# Patient Record
Sex: FEMALE
[De-identification: names, ages, dates, MRNs, and addresses within clinical notes are randomized per-mention and may not be internally consistent; named-entity substitution may affect disease eponyms.]

---

## 2021-12-02 ENCOUNTER — TRANSCRIPTION ENCOUNTER (OUTPATIENT)
Age: 36
End: 2021-12-02

## 2022-12-19 ENCOUNTER — NON-APPOINTMENT (OUTPATIENT)
Age: 37
End: 2022-12-19

## 2022-12-21 ENCOUNTER — TRANSCRIPTION ENCOUNTER (OUTPATIENT)
Age: 37
End: 2022-12-21

## 2022-12-21 DIAGNOSIS — Z20.822 CONTACT WITH AND (SUSPECTED) EXPOSURE TO COVID-19: ICD-10-CM

## 2022-12-22 ENCOUNTER — APPOINTMENT (OUTPATIENT)
Age: 37
End: 2022-12-22

## 2022-12-22 LAB
RAPID RVP RESULT: DETECTED
RV+EV RNA SPEC QL NAA+PROBE: DETECTED
SARS-COV-2 RNA PNL RESP NAA+PROBE: NOT DETECTED

## 2022-12-24 ENCOUNTER — NON-APPOINTMENT (OUTPATIENT)
Age: 37
End: 2022-12-24

## 2023-03-13 ENCOUNTER — APPOINTMENT (OUTPATIENT)
Dept: OBGYN | Facility: CLINIC | Age: 38
End: 2023-03-13
Payer: COMMERCIAL

## 2023-03-13 ENCOUNTER — RESULT CHARGE (OUTPATIENT)
Age: 38
End: 2023-03-13

## 2023-03-13 ENCOUNTER — NON-APPOINTMENT (OUTPATIENT)
Age: 38
End: 2023-03-13

## 2023-03-13 VITALS
HEIGHT: 60 IN | BODY MASS INDEX: 25.13 KG/M2 | DIASTOLIC BLOOD PRESSURE: 70 MMHG | WEIGHT: 128 LBS | SYSTOLIC BLOOD PRESSURE: 110 MMHG

## 2023-03-13 DIAGNOSIS — Z12.4 ENCOUNTER FOR SCREENING FOR MALIGNANT NEOPLASM OF CERVIX: ICD-10-CM

## 2023-03-13 DIAGNOSIS — Z11.3 ENCOUNTER FOR SCREENING FOR INFECTIONS WITH A PREDOMINANTLY SEXUAL MODE OF TRANSMISSION: ICD-10-CM

## 2023-03-13 PROCEDURE — 90471 IMMUNIZATION ADMIN: CPT

## 2023-03-13 PROCEDURE — 90651 9VHPV VACCINE 2/3 DOSE IM: CPT

## 2023-03-13 PROCEDURE — 81025 URINE PREGNANCY TEST: CPT

## 2023-03-13 PROCEDURE — 36415 COLL VENOUS BLD VENIPUNCTURE: CPT

## 2023-03-13 PROCEDURE — 99385 PREV VISIT NEW AGE 18-39: CPT | Mod: 25

## 2023-03-13 NOTE — HISTORY OF PRESENT ILLNESS
[Patient reported PAP Smear was normal] : Patient reported PAP Smear was normal [LMP unknown] : LMP unknown [N] : Patient reports normal menses [Oral Contraceptive] : uses oral contraception pills [Y] : Positive pregnancy history [unknown] : Patient is unsure of the date of her LMP [Menarche Age: ____] : age at menarche was [unfilled] [No] : Patient does not have concerns regarding sex [Currently Active] : currently active [Patient would like to be screened for STIs] : Patient would like to be screened for STIs [Men] : men [FreeTextEntry1] : Alejandra is a  LMP unknown on BRIGIDO who presents for annual exam. She is without complaints. Denies pelvic pain, abnormal bleeding, or vaginal discharge. Denies issues with bowel or bladder function. \par She is sexually active with 1 male partner (s). She is using BRIGIDO for contraception. Denies pain with intercourse. She is sexually satisfied. \par Lives with alone. 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 Denies depression/abuse. \par Immunizations: Desires gardasil\par  [PapSmeardate] : 03/13/22 [PGxTotal] : 1 [[facility]] : 0 [PGHxABInduced] : 1

## 2023-03-13 NOTE — PHYSICAL EXAM
[Chaperone Present] : A chaperone was present in the examining room during all aspects of the physical examination [FreeTextEntry1] : ashly [Appropriately responsive] : appropriately responsive [Alert] : alert [No Acute Distress] : no acute distress [No Lymphadenopathy] : no lymphadenopathy [Soft] : soft [Non-tender] : non-tender [Non-distended] : non-distended [Oriented x3] : oriented x3 [Examination Of The Breasts] : a normal appearance [No Discharge] : no discharge [No Masses] : no breast masses were palpable [Labia Majora] : normal [Labia Minora] : normal [No Bleeding] : There was no active vaginal bleeding [Normal] : normal [Uterine Adnexae] : non-palpable

## 2023-03-13 NOTE — DISCUSSION/SUMMARY
[FreeTextEntry1] :   The benefits of adequate calcium intake and a daily multivitamin along with routine daily cardiovascular exercise were reviewed with the patient.\par   The patient was informed regarding the benefits of a screening colonoscopy.\par   The importance of safer-sex was discussed with the patient. She desires screening for sexually transmitted diseases. Bloodwork drawn today in office. \par We reviewed ASCCP/ACOG guidelines for pap smears. \par She is not sure if she desires childbearing. We discussed age-related fertility decline and I recommended consultation for egg freezing.

## 2023-03-14 LAB
C TRACH RRNA SPEC QL NAA+PROBE: NOT DETECTED
HAV IGM SER QL: NONREACTIVE
HBV CORE IGM SER QL: NONREACTIVE
HBV SURFACE AG SER QL: NONREACTIVE
HCV AB SER QL: NONREACTIVE
HCV S/CO RATIO: 0.1 S/CO
HIV1+2 AB SPEC QL IA.RAPID: NONREACTIVE
HPV HIGH+LOW RISK DNA PNL CVX: NOT DETECTED
N GONORRHOEA RRNA SPEC QL NAA+PROBE: NOT DETECTED
SOURCE TP AMPLIFICATION: NORMAL

## 2023-03-17 ENCOUNTER — NON-APPOINTMENT (OUTPATIENT)
Age: 38
End: 2023-03-17

## 2023-03-17 LAB — T PALLIDUM AB SER QL IA: NEGATIVE

## 2023-03-19 LAB — CYTOLOGY CVX/VAG DOC THIN PREP: ABNORMAL

## 2023-03-29 LAB
HCG UR QL: NEGATIVE
QUALITY CONTROL: YES

## 2023-04-28 ENCOUNTER — APPOINTMENT (OUTPATIENT)
Dept: FAMILY MEDICINE | Facility: CLINIC | Age: 38
End: 2023-04-28
Payer: COMMERCIAL

## 2023-04-28 VITALS
WEIGHT: 126 LBS | HEART RATE: 62 BPM | OXYGEN SATURATION: 98 % | BODY MASS INDEX: 24.74 KG/M2 | HEIGHT: 60 IN | DIASTOLIC BLOOD PRESSURE: 70 MMHG | SYSTOLIC BLOOD PRESSURE: 110 MMHG

## 2023-04-28 PROCEDURE — 99203 OFFICE O/P NEW LOW 30 MIN: CPT

## 2023-04-28 NOTE — HISTORY OF PRESENT ILLNESS
[FreeTextEntry8] : 39yo F presents with b/l knee pain x 1 month. Pt reports she is exercising regularly and has worsening pain when she bends downa nd extends. Pt reports no direct injury, denies any numbness/tingling in her feet.

## 2023-04-28 NOTE — PHYSICAL EXAM
[Normal] : normal rate, regular rhythm, normal S1 and S2 and no murmur heard [No Joint Swelling] : no joint swelling [No Rash] : no rash [de-identified] : +b/l tenderness over patella

## 2023-05-08 ENCOUNTER — MED ADMIN CHARGE (OUTPATIENT)
Age: 38
End: 2023-05-08

## 2023-05-08 ENCOUNTER — APPOINTMENT (OUTPATIENT)
Dept: OBGYN | Facility: CLINIC | Age: 38
End: 2023-05-08
Payer: COMMERCIAL

## 2023-05-08 DIAGNOSIS — Z23 ENCOUNTER FOR IMMUNIZATION: ICD-10-CM

## 2023-05-08 DIAGNOSIS — B00.1 HERPESVIRAL VESICULAR DERMATITIS: ICD-10-CM

## 2023-05-08 LAB
HCG UR QL: NEGATIVE
QUALITY CONTROL: YES

## 2023-05-08 PROCEDURE — 81025 URINE PREGNANCY TEST: CPT

## 2023-05-08 PROCEDURE — 90471 IMMUNIZATION ADMIN: CPT

## 2023-05-08 PROCEDURE — 90651 9VHPV VACCINE 2/3 DOSE IM: CPT

## 2023-06-09 DIAGNOSIS — H10.9 UNSPECIFIED CONJUNCTIVITIS: ICD-10-CM

## 2023-07-05 ENCOUNTER — RX RENEWAL (OUTPATIENT)
Age: 38
End: 2023-07-05

## 2023-07-10 DIAGNOSIS — T75.3XXA MOTION SICKNESS, INITIAL ENCOUNTER: ICD-10-CM

## 2023-07-10 DIAGNOSIS — F40.243 FEAR OF FLYING: ICD-10-CM

## 2023-07-10 RX ORDER — ALPRAZOLAM 0.5 MG/1
0.5 TABLET ORAL
Qty: 4 | Refills: 0 | Status: ACTIVE | COMMUNITY
Start: 2023-07-10 | End: 1900-01-01

## 2023-07-10 RX ORDER — SCOPOLAMINE 1.5 MG/1
1 PATCH, EXTENDED RELEASE TRANSDERMAL
Qty: 4 | Refills: 1 | Status: ACTIVE | COMMUNITY
Start: 2023-07-10 | End: 1900-01-01

## 2023-09-05 ENCOUNTER — APPOINTMENT (OUTPATIENT)
Dept: OBGYN | Facility: CLINIC | Age: 38
End: 2023-09-05

## 2023-09-11 ENCOUNTER — APPOINTMENT (OUTPATIENT)
Dept: OBGYN | Facility: CLINIC | Age: 38
End: 2023-09-11

## 2023-09-12 ENCOUNTER — MED ADMIN CHARGE (OUTPATIENT)
Age: 38
End: 2023-09-12

## 2023-09-12 ENCOUNTER — RESULT CHARGE (OUTPATIENT)
Age: 38
End: 2023-09-12

## 2023-09-12 ENCOUNTER — APPOINTMENT (OUTPATIENT)
Dept: OBGYN | Facility: CLINIC | Age: 38
End: 2023-09-12
Payer: COMMERCIAL

## 2023-09-12 LAB
HCG UR QL: NEGATIVE
QUALITY CONTROL: YES

## 2023-09-12 PROCEDURE — 90651 9VHPV VACCINE 2/3 DOSE IM: CPT

## 2023-09-12 PROCEDURE — 81025 URINE PREGNANCY TEST: CPT

## 2023-09-12 PROCEDURE — 90471 IMMUNIZATION ADMIN: CPT

## 2023-10-02 DIAGNOSIS — L65.9 NONSCARRING HAIR LOSS, UNSPECIFIED: ICD-10-CM

## 2023-10-03 ENCOUNTER — APPOINTMENT (OUTPATIENT)
Age: 38
End: 2023-10-03

## 2023-10-03 LAB
ANTI-MUELLERIAN HORMONE: 1.34 NG/ML
DHEA-S SERPL-MCNC: 124 UG/DL
ESTRADIOL SERPL-MCNC: <5 PG/ML
FERRITIN SERPL-MCNC: 64 NG/ML
FOLATE SERPL-MCNC: 13.7 NG/ML
FSH SERPL-MCNC: 4.7 IU/L
IRON SATN MFR SERPL: 26 %
IRON SERPL-MCNC: 113 UG/DL
LH SERPL-ACNC: 4.9 IU/L
TESTOST FREE SERPL-MCNC: 1.6 PG/ML
TESTOST SERPL-MCNC: 58.1 NG/DL
TIBC SERPL-MCNC: 442 UG/DL
UIBC SERPL-MCNC: 329 UG/DL
VIT B12 SERPL-MCNC: 357 PG/ML

## 2023-10-06 ENCOUNTER — APPOINTMENT (OUTPATIENT)
Age: 38
End: 2023-10-06

## 2023-10-06 LAB — 17OHP SERPL-MCNC: <10 NG/DL

## 2023-12-11 RX ORDER — FAMCICLOVIR 500 MG/1
500 TABLET, FILM COATED ORAL
Qty: 60 | Refills: 1 | Status: ACTIVE | COMMUNITY
Start: 2023-05-08 | End: 1900-01-01

## 2024-01-29 ENCOUNTER — APPOINTMENT (OUTPATIENT)
Dept: FAMILY MEDICINE | Facility: CLINIC | Age: 39
End: 2024-01-29
Payer: COMMERCIAL

## 2024-01-29 ENCOUNTER — LABORATORY RESULT (OUTPATIENT)
Age: 39
End: 2024-01-29

## 2024-01-29 DIAGNOSIS — M25.562 PAIN IN RIGHT KNEE: ICD-10-CM

## 2024-01-29 DIAGNOSIS — Z32.02 ENCOUNTER FOR PREGNANCY TEST, RESULT NEGATIVE: ICD-10-CM

## 2024-01-29 DIAGNOSIS — Z23 ENCOUNTER FOR IMMUNIZATION: ICD-10-CM

## 2024-01-29 DIAGNOSIS — R92.30 DENSE BREASTS, UNSPECIFIED: ICD-10-CM

## 2024-01-29 DIAGNOSIS — E06.9 THYROIDITIS, UNSPECIFIED: ICD-10-CM

## 2024-01-29 DIAGNOSIS — T14.8XXA OTHER INJURY OF UNSPECIFIED BODY REGION, INITIAL ENCOUNTER: ICD-10-CM

## 2024-01-29 DIAGNOSIS — Z13.220 ENCOUNTER FOR SCREENING FOR LIPOID DISORDERS: ICD-10-CM

## 2024-01-29 DIAGNOSIS — Z00.00 ENCOUNTER FOR GENERAL ADULT MEDICAL EXAMINATION W/OUT ABNORMAL FINDINGS: ICD-10-CM

## 2024-01-29 DIAGNOSIS — Z13.29 ENCOUNTER FOR SCREENING FOR OTHER SUSPECTED ENDOCRINE DISORDER: ICD-10-CM

## 2024-01-29 DIAGNOSIS — E53.8 DEFICIENCY OF OTHER SPECIFIED B GROUP VITAMINS: ICD-10-CM

## 2024-01-29 DIAGNOSIS — Z12.31 ENCOUNTER FOR SCREENING MAMMOGRAM FOR MALIGNANT NEOPLASM OF BREAST: ICD-10-CM

## 2024-01-29 DIAGNOSIS — Z13.89 ENCOUNTER FOR SCREENING FOR OTHER DISORDER: ICD-10-CM

## 2024-01-29 DIAGNOSIS — Z13.1 ENCOUNTER FOR SCREENING FOR DIABETES MELLITUS: ICD-10-CM

## 2024-01-29 DIAGNOSIS — M25.561 PAIN IN RIGHT KNEE: ICD-10-CM

## 2024-01-29 PROCEDURE — 99214 OFFICE O/P EST MOD 30 MIN: CPT

## 2024-01-29 RX ORDER — TOBRAMYCIN AND DEXAMETHASONE 3; 1 MG/ML; MG/ML
0.3-0.1 SUSPENSION/ DROPS OPHTHALMIC
Qty: 1 | Refills: 0 | Status: COMPLETED | COMMUNITY
Start: 2023-06-09 | End: 2024-01-29

## 2024-01-29 RX ORDER — NORETHINDRONE ACETATE AND ETHINYL ESTRADIOL 1MG-20(24)
1-20 KIT ORAL
Refills: 0 | Status: DISCONTINUED | COMMUNITY
End: 2024-01-29

## 2024-01-29 RX ORDER — AMOXICILLIN AND CLAVULANATE POTASSIUM 875; 125 MG/1; MG/1
875-125 TABLET, COATED ORAL
Qty: 14 | Refills: 0 | Status: COMPLETED | COMMUNITY
Start: 2023-10-04 | End: 2024-01-29

## 2024-01-29 NOTE — HISTORY OF PRESENT ILLNESS
[FreeTextEntry1] : CONOR is a 39 year old female here for a CPE. [de-identified] : 39 year old female for annual exam  sees GYn on [facility]CP phmx sig for "thyroiditis" family hx Father unknown, Mother healthy, maternal GM goiter  exercises regularly healthy diet

## 2024-01-29 NOTE — HEALTH RISK ASSESSMENT
[Patient reported PAP Smear was abnormal] : Patient reported PAP Smear was abnormal [No falls in past year] : Patient reported no falls in the past year [None] : None [Never] : Never [Change in mental status noted] : No change in mental status noted [Good] : ~his/her~  mood as  good [Yes] : Yes [Monthly or less (1 pt)] : Monthly or less (1 point) [1 or 2 (0 pts)] : 1 or 2 (0 points) [Never (0 pts)] : Never (0 points) [0] : 2) Feeling down, depressed, or hopeless: Not at all (0) [PHQ-2 Negative - No further assessment needed] : PHQ-2 Negative - No further assessment needed [Audit-CScore] : 1 [CSF2Lvexb] : 0 [de-identified] : regular aerobic and strength training [PapSmearDate] : 03/23

## 2024-01-29 NOTE — HEALTH RISK ASSESSMENT
[Patient reported PAP Smear was abnormal] : Patient reported PAP Smear was abnormal [No falls in past year] : Patient reported no falls in the past year [None] : None [Never] : Never [Change in mental status noted] : No change in mental status noted [Good] : ~his/her~  mood as  good [Yes] : Yes [Monthly or less (1 pt)] : Monthly or less (1 point) [1 or 2 (0 pts)] : 1 or 2 (0 points) [Never (0 pts)] : Never (0 points) [0] : 2) Feeling down, depressed, or hopeless: Not at all (0) [PHQ-2 Negative - No further assessment needed] : PHQ-2 Negative - No further assessment needed [Audit-CScore] : 1 [de-identified] : regular aerobic and strength training [[id number]] : 0 [PapSmearDate] : 03/23

## 2024-01-29 NOTE — HISTORY OF PRESENT ILLNESS
[FreeTextEntry1] : CONOR is a 39 year old female here for a CPE. [de-identified] : 39 year old female for annual exam  sees GYn on [facility]CP phmx sig for "thyroiditis" family hx Father unknown, Mother healthy, maternal GM goiter  exercises regularly healthy diet

## 2024-01-30 LAB
25(OH)D3 SERPL-MCNC: 32.4 NG/ML
ALBUMIN SERPL ELPH-MCNC: 4.4 G/DL
ALP BLD-CCNC: 84 U/L
ALT SERPL-CCNC: 19 U/L
ANION GAP SERPL CALC-SCNC: 10 MMOL/L
APPEARANCE: CLEAR
AST SERPL-CCNC: 33 U/L
BACTERIA: NEGATIVE /HPF
BASOPHILS # BLD AUTO: 0.03 K/UL
BASOPHILS NFR BLD AUTO: 0.5 %
BILIRUB SERPL-MCNC: 0.4 MG/DL
BILIRUBIN URINE: NEGATIVE
BLOOD URINE: ABNORMAL
BUN SERPL-MCNC: 21 MG/DL
C TRACH RRNA SPEC QL NAA+PROBE: NOT DETECTED
CALCIUM SERPL-MCNC: 10 MG/DL
CAST: 0 /LPF
CHLORIDE SERPL-SCNC: 102 MMOL/L
CHOLEST SERPL-MCNC: 156 MG/DL
CO2 SERPL-SCNC: 23 MMOL/L
COLOR: YELLOW
CREAT SERPL-MCNC: 0.86 MG/DL
EGFR: 88 ML/MIN/1.73M2
EOSINOPHIL # BLD AUTO: 0.17 K/UL
EOSINOPHIL NFR BLD AUTO: 2.7 %
EPITHELIAL CELLS: 2 /HPF
ESTIMATED AVERAGE GLUCOSE: 97 MG/DL
GLUCOSE QUALITATIVE U: NEGATIVE MG/DL
GLUCOSE SERPL-MCNC: 91 MG/DL
HBA1C MFR BLD HPLC: 5 %
HCT VFR BLD CALC: 44.6 %
HDLC SERPL-MCNC: 49 MG/DL
HGB BLD-MCNC: 14.6 G/DL
HSV 1+2 IGG SER IA-IMP: NEGATIVE
HSV 1+2 IGG SER IA-IMP: POSITIVE
HSV1 IGG SER QL: 50.5 INDEX
HSV2 IGG SER QL: 0.15 INDEX
IMM GRANULOCYTES NFR BLD AUTO: 0.3 %
KETONES URINE: NEGATIVE MG/DL
LDLC SERPL CALC-MCNC: 91 MG/DL
LEUKOCYTE ESTERASE URINE: NEGATIVE
LYMPHOCYTES # BLD AUTO: 2.25 K/UL
LYMPHOCYTES NFR BLD AUTO: 35.4 %
MAN DIFF?: NORMAL
MCHC RBC-ENTMCNC: 29.9 PG
MCHC RBC-ENTMCNC: 32.7 GM/DL
MCV RBC AUTO: 91.2 FL
MICROSCOPIC-UA: NORMAL
MONOCYTES # BLD AUTO: 0.46 K/UL
MONOCYTES NFR BLD AUTO: 7.2 %
N GONORRHOEA RRNA SPEC QL NAA+PROBE: NOT DETECTED
NEUTROPHILS # BLD AUTO: 3.43 K/UL
NEUTROPHILS NFR BLD AUTO: 53.9 %
NITRITE URINE: NEGATIVE
NONHDLC SERPL-MCNC: 107 MG/DL
PH URINE: 6
PLATELET # BLD AUTO: 413 K/UL
POTASSIUM SERPL-SCNC: 5.4 MMOL/L
PROT SERPL-MCNC: 8.2 G/DL
PROTEIN URINE: NEGATIVE MG/DL
RBC # BLD: 4.89 M/UL
RBC # FLD: 14.4 %
RED BLOOD CELLS URINE: 0 /HPF
SODIUM SERPL-SCNC: 135 MMOL/L
SOURCE AMPLIFICATION: NORMAL
SPECIFIC GRAVITY URINE: 1.02
T PALLIDUM AB SER QL IA: NEGATIVE
T3 SERPL-MCNC: 127 NG/DL
T3FREE SERPL-MCNC: 2.98 PG/ML
T3RU NFR SERPL: 1 TBI
T4 FREE SERPL-MCNC: 1.3 NG/DL
T4 SERPL-MCNC: 9 UG/DL
THYROGLOB AB SERPL-ACNC: <20 IU/ML
THYROPEROXIDASE AB SERPL IA-ACNC: 242 IU/ML
TRIGL SERPL-MCNC: 85 MG/DL
TSH SERPL-ACNC: 1.13 UIU/ML
URATE SERPL-MCNC: 3.9 MG/DL
UROBILINOGEN URINE: 0.2 MG/DL
WBC # FLD AUTO: 6.36 K/UL
WHITE BLOOD CELLS URINE: 0 /HPF

## 2024-01-31 LAB — HIV1+2 AB SPEC QL IA.RAPID: NONREACTIVE

## 2024-02-05 LAB — TSI ACT/NOR SER: <0.1 IU/L

## 2024-02-17 ENCOUNTER — OUTPATIENT (OUTPATIENT)
Dept: OUTPATIENT SERVICES | Facility: HOSPITAL | Age: 39
LOS: 1 days | End: 2024-02-17
Payer: COMMERCIAL

## 2024-02-17 ENCOUNTER — RESULT REVIEW (OUTPATIENT)
Age: 39
End: 2024-02-17

## 2024-02-17 ENCOUNTER — APPOINTMENT (OUTPATIENT)
Dept: MAMMOGRAPHY | Facility: CLINIC | Age: 39
End: 2024-02-17
Payer: COMMERCIAL

## 2024-02-17 ENCOUNTER — APPOINTMENT (OUTPATIENT)
Dept: ULTRASOUND IMAGING | Facility: CLINIC | Age: 39
End: 2024-02-17
Payer: COMMERCIAL

## 2024-02-17 DIAGNOSIS — Z12.31 ENCOUNTER FOR SCREENING MAMMOGRAM FOR MALIGNANT NEOPLASM OF BREAST: ICD-10-CM

## 2024-02-17 PROCEDURE — 77067 SCR MAMMO BI INCL CAD: CPT

## 2024-02-17 PROCEDURE — 76536 US EXAM OF HEAD AND NECK: CPT

## 2024-02-17 PROCEDURE — 77063 BREAST TOMOSYNTHESIS BI: CPT | Mod: 26

## 2024-02-17 PROCEDURE — 77063 BREAST TOMOSYNTHESIS BI: CPT

## 2024-02-17 PROCEDURE — 76536 US EXAM OF HEAD AND NECK: CPT | Mod: 26

## 2024-02-17 PROCEDURE — 76641 ULTRASOUND BREAST COMPLETE: CPT | Mod: 26,50

## 2024-02-17 PROCEDURE — 77067 SCR MAMMO BI INCL CAD: CPT | Mod: 26

## 2024-02-17 PROCEDURE — 76641 ULTRASOUND BREAST COMPLETE: CPT

## 2024-03-19 ENCOUNTER — RX RENEWAL (OUTPATIENT)
Age: 39
End: 2024-03-19

## 2024-04-17 ENCOUNTER — APPOINTMENT (OUTPATIENT)
Dept: OBGYN | Facility: CLINIC | Age: 39
End: 2024-04-17
Payer: COMMERCIAL

## 2024-04-17 VITALS
WEIGHT: 130 LBS | SYSTOLIC BLOOD PRESSURE: 124 MMHG | DIASTOLIC BLOOD PRESSURE: 70 MMHG | BODY MASS INDEX: 25.52 KG/M2 | HEIGHT: 60 IN

## 2024-04-17 DIAGNOSIS — Z01.419 ENCOUNTER FOR GYNECOLOGICAL EXAMINATION (GENERAL) (ROUTINE) W/OUT ABNORMAL FINDINGS: ICD-10-CM

## 2024-04-17 DIAGNOSIS — N95.2 POSTMENOPAUSAL ATROPHIC VAGINITIS: ICD-10-CM

## 2024-04-17 DIAGNOSIS — Z30.011 ENCOUNTER FOR INITIAL PRESCRIPTION OF CONTRACEPTIVE PILLS: ICD-10-CM

## 2024-04-17 PROCEDURE — 99396 PREV VISIT EST AGE 40-64: CPT

## 2024-04-17 PROCEDURE — 99459 PELVIC EXAMINATION: CPT

## 2024-04-17 RX ORDER — ESTRADIOL 10 UG/1
10 TABLET, FILM COATED VAGINAL
Qty: 36 | Refills: 1 | Status: ACTIVE | COMMUNITY
Start: 2024-04-17 | End: 1900-01-01

## 2024-04-17 NOTE — HISTORY OF PRESENT ILLNESS
[FreeTextEntry1] : Alejandra is a  LMP unknown on BRIGIDO who presents for annual exam. She is without complaints. Denies pelvic pain, abnormal bleeding, or vaginal discharge. Denies issues with bowel or bladder function.  She is sexually active with male partner (s). She is using OCPs for contraception. Denies pain with intercourse. She is sexually satisfied.  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  Immunizations: completed gardasil

## 2024-04-18 ENCOUNTER — TRANSCRIPTION ENCOUNTER (OUTPATIENT)
Age: 39
End: 2024-04-18

## 2024-04-19 PROBLEM — Z01.419 WELL FEMALE EXAM WITH ROUTINE GYNECOLOGICAL EXAM: Status: ACTIVE | Noted: 2023-03-13

## 2024-04-19 PROBLEM — Z30.011 ENCOUNTER FOR INITIAL PRESCRIPTION OF CONTRACEPTIVE PILLS: Status: ACTIVE | Noted: 2023-03-13

## 2024-05-07 DIAGNOSIS — M25.572 PAIN IN RIGHT ANKLE AND JOINTS OF RIGHT FOOT: ICD-10-CM

## 2024-05-07 DIAGNOSIS — M25.571 PAIN IN RIGHT ANKLE AND JOINTS OF RIGHT FOOT: ICD-10-CM

## 2024-05-07 DIAGNOSIS — M54.2 CERVICALGIA: ICD-10-CM

## 2024-05-07 DIAGNOSIS — M25.579 PAIN IN UNSPECIFIED ANKLE AND JOINTS OF UNSPECIFIED FOOT: ICD-10-CM

## 2024-06-04 ENCOUNTER — RX RENEWAL (OUTPATIENT)
Age: 39
End: 2024-06-04

## 2024-06-04 RX ORDER — NORETHINDRONE ACETATE AND ETHINYL ESTRADIOL 1MG-20(24)
1-20 KIT ORAL
Qty: 84 | Refills: 0 | Status: ACTIVE | COMMUNITY
Start: 2023-03-13 | End: 1900-01-01

## 2024-07-03 DIAGNOSIS — B37.9 CANDIDIASIS, UNSPECIFIED: ICD-10-CM

## 2024-07-03 RX ORDER — FLUCONAZOLE 150 MG/1
150 TABLET ORAL
Qty: 3 | Refills: 1 | Status: ACTIVE | COMMUNITY
Start: 2024-07-03 | End: 1900-01-01

## 2024-08-20 ENCOUNTER — APPOINTMENT (OUTPATIENT)
Dept: OBGYN | Facility: CLINIC | Age: 39
End: 2024-08-20
Payer: COMMERCIAL

## 2024-08-20 ENCOUNTER — APPOINTMENT (OUTPATIENT)
Dept: ANTEPARTUM | Facility: CLINIC | Age: 39
End: 2024-08-20
Payer: COMMERCIAL

## 2024-08-20 ENCOUNTER — ASOB RESULT (OUTPATIENT)
Age: 39
End: 2024-08-20

## 2024-08-20 VITALS
SYSTOLIC BLOOD PRESSURE: 112 MMHG | DIASTOLIC BLOOD PRESSURE: 76 MMHG | HEIGHT: 60 IN | WEIGHT: 134.5 LBS | BODY MASS INDEX: 26.41 KG/M2

## 2024-08-20 DIAGNOSIS — R10.2 PELVIC AND PERINEAL PAIN: ICD-10-CM

## 2024-08-20 DIAGNOSIS — R30.0 DYSURIA: ICD-10-CM

## 2024-08-20 DIAGNOSIS — N89.8 OTHER SPECIFIED NONINFLAMMATORY DISORDERS OF VAGINA: ICD-10-CM

## 2024-08-20 LAB
APPEARANCE: CLEAR
BILIRUBIN URINE: NEGATIVE
BLOOD URINE: NEGATIVE
COLOR: YELLOW
GLUCOSE QUALITATIVE U: NEGATIVE
KETONES URINE: NEGATIVE
LEUKOCYTE ESTERASE URINE: ABNORMAL
NITRITE URINE: NEGATIVE
PH URINE: 7
PROTEIN URINE: NEGATIVE
SPECIFIC GRAVITY URINE: 1.01
UROBILINOGEN URINE: 0.2 (ref 0.2–?)

## 2024-08-20 PROCEDURE — 76857 US EXAM PELVIC LIMITED: CPT | Mod: 59

## 2024-08-20 PROCEDURE — 99213 OFFICE O/P EST LOW 20 MIN: CPT

## 2024-08-20 PROCEDURE — 99459 PELVIC EXAMINATION: CPT

## 2024-08-20 PROCEDURE — 76830 TRANSVAGINAL US NON-OB: CPT

## 2024-08-20 NOTE — HISTORY OF PRESENT ILLNESS
[FreeTextEntry1] : Alejandra presents for 1 month of pelvic pain and vaginal irritation which started after trip to [place].  Was treated with fluconazole but is not feeling better.  Sonogram today is showing 4cm uterus with 5mm lining, normal left ovary and right ovary with 2cm cyst.

## 2024-08-20 NOTE — PHYSICAL EXAM
[Chaperone Present] : A chaperone was present in the examining room during all aspects of the physical examination [50322] : A chaperone was present during the pelvic exam. [FreeTextEntry2] : ashly [Appropriately responsive] : appropriately responsive [Alert] : alert [No Acute Distress] : no acute distress [Oriented x3] : oriented x3 [Labia Majora] : normal [Labia Minora] : normal [No Bleeding] : There was no active vaginal bleeding [Normal] : normal [Uterine Adnexae] : non-palpable

## 2024-08-20 NOTE — PHYSICAL EXAM
[Chaperone Present] : A chaperone was present in the examining room during all aspects of the physical examination [21019] : A chaperone was present during the pelvic exam. [FreeTextEntry2] : ashly [Appropriately responsive] : appropriately responsive [Alert] : alert [No Acute Distress] : no acute distress [Oriented x3] : oriented x3 [Labia Majora] : normal [Labia Minora] : normal [No Bleeding] : There was no active vaginal bleeding [Normal] : normal [Uterine Adnexae] : non-palpable

## 2024-08-20 NOTE — HISTORY OF PRESENT ILLNESS
[FreeTextEntry1] : Alejandra presents for 1 month of pelvic pain and vaginal irritation which started after trip to [place].  Was treated with fluconazole but is not feeling better.  Sonogram today is showing 4cm uterus with 5mm lining, normal left ovary and right ovary with 2cm cyst.

## 2024-08-20 NOTE — DISCUSSION/SUMMARY
[FreeTextEntry1] : Vaginitis panel and urine culture sent due to complaints, and we will treat if positive.  We reviewed the sonogram which showed normal findings.  I advised that due to connection to trip abroad, she may need to see GI if symptoms do not improve.

## 2024-08-23 ENCOUNTER — TRANSCRIPTION ENCOUNTER (OUTPATIENT)
Age: 39
End: 2024-08-23

## 2024-08-23 LAB — BACTERIA UR CULT: NORMAL

## 2024-08-26 ENCOUNTER — TRANSCRIPTION ENCOUNTER (OUTPATIENT)
Age: 39
End: 2024-08-26

## 2024-08-26 ENCOUNTER — RX RENEWAL (OUTPATIENT)
Age: 39
End: 2024-08-26

## 2024-08-30 ENCOUNTER — TRANSCRIPTION ENCOUNTER (OUTPATIENT)
Age: 39
End: 2024-08-30

## 2024-08-31 ENCOUNTER — TRANSCRIPTION ENCOUNTER (OUTPATIENT)
Age: 39
End: 2024-08-31

## 2024-09-16 ENCOUNTER — RX RENEWAL (OUTPATIENT)
Age: 39
End: 2024-09-16

## 2024-09-24 LAB — SARS-COV-2 N GENE NPH QL NAA+PROBE: NOT DETECTED

## 2024-09-25 DIAGNOSIS — M25.551 PAIN IN RIGHT HIP: ICD-10-CM

## 2024-09-25 DIAGNOSIS — M25.552 PAIN IN RIGHT HIP: ICD-10-CM

## 2024-10-01 DIAGNOSIS — B34.9 VIRAL INFECTION, UNSPECIFIED: ICD-10-CM

## 2024-10-02 LAB
RAPID RVP RESULT: NOT DETECTED
SARS-COV-2 RNA PNL RESP NAA+PROBE: NOT DETECTED

## 2024-10-09 ENCOUNTER — MED ADMIN CHARGE (OUTPATIENT)
Age: 39
End: 2024-10-09

## 2024-11-19 ENCOUNTER — RX RENEWAL (OUTPATIENT)
Age: 39
End: 2024-11-19

## 2024-11-26 RX ORDER — ONDANSETRON 4 MG/1
4 TABLET ORAL EVERY 6 HOURS
Qty: 20 | Refills: 0 | Status: ACTIVE | COMMUNITY
Start: 2024-11-26 | End: 1900-01-01

## 2024-12-03 DIAGNOSIS — Z01.84 ENCOUNTER FOR ANTIBODY RESPONSE EXAMINATION: ICD-10-CM

## 2024-12-05 LAB
HCOV 229E+HKU1+NL63+OC43 NPH QL NAA+PR: DETECTED
RAPID RVP RESULT: DETECTED
SARS-COV-2 RNA RESP QL NAA+PROBE: NOT DETECTED

## 2025-01-13 ENCOUNTER — APPOINTMENT (OUTPATIENT)
Age: 40
End: 2025-01-13

## 2025-01-13 ENCOUNTER — APPOINTMENT (OUTPATIENT)
Dept: FAMILY MEDICINE | Facility: CLINIC | Age: 40
End: 2025-01-13
Payer: COMMERCIAL

## 2025-01-13 VITALS
HEART RATE: 63 BPM | SYSTOLIC BLOOD PRESSURE: 100 MMHG | DIASTOLIC BLOOD PRESSURE: 66 MMHG | BODY MASS INDEX: 26.31 KG/M2 | HEIGHT: 60 IN | WEIGHT: 134 LBS | OXYGEN SATURATION: 98 %

## 2025-01-13 DIAGNOSIS — Z12.39 ENCOUNTER FOR OTHER SCREENING FOR MALIGNANT NEOPLASM OF BREAST: ICD-10-CM

## 2025-01-13 DIAGNOSIS — M25.511 PAIN IN RIGHT SHOULDER: ICD-10-CM

## 2025-01-13 DIAGNOSIS — Z00.00 ENCOUNTER FOR GENERAL ADULT MEDICAL EXAMINATION W/OUT ABNORMAL FINDINGS: ICD-10-CM

## 2025-01-13 LAB
24R-OH-CALCIDIOL SERPL-MCNC: 52.2 PG/ML
ALBUMIN SERPL ELPH-MCNC: 4.4 G/DL
ALP BLD-CCNC: 107 U/L
ALT SERPL-CCNC: 20 U/L
ANION GAP SERPL CALC-SCNC: 16 MMOL/L
APPEARANCE: CLEAR
AST SERPL-CCNC: 30 U/L
BACTERIA: NEGATIVE /HPF
BASOPHILS # BLD AUTO: 0.03 K/UL
BASOPHILS NFR BLD AUTO: 0.5 %
BILIRUB SERPL-MCNC: 0.5 MG/DL
BILIRUBIN URINE: NEGATIVE
BLOOD URINE: ABNORMAL
BUN SERPL-MCNC: 15 MG/DL
C TRACH RRNA SPEC QL NAA+PROBE: NOT DETECTED
CALCIUM SERPL-MCNC: 9.8 MG/DL
CAST: 0 /LPF
CHLORIDE SERPL-SCNC: 102 MMOL/L
CHOLEST SERPL-MCNC: 176 MG/DL
CK SERPL-CCNC: 187 U/L
CO2 SERPL-SCNC: 20 MMOL/L
COLOR: YELLOW
CREAT SERPL-MCNC: 0.92 MG/DL
EGFR: 80 ML/MIN/1.73M2
EOSINOPHIL # BLD AUTO: 0.19 K/UL
EOSINOPHIL NFR BLD AUTO: 3.2 %
EPITHELIAL CELLS: 2 /HPF
ESTIMATED AVERAGE GLUCOSE: 103 MG/DL
FERRITIN SERPL-MCNC: 88 NG/ML
GLUCOSE QUALITATIVE U: NEGATIVE MG/DL
GLUCOSE SERPL-MCNC: 93 MG/DL
HBA1C MFR BLD HPLC: 5.2 %
HCT VFR BLD CALC: 43.7 %
HDLC SERPL-MCNC: 54 MG/DL
HGB BLD-MCNC: 13.6 G/DL
HSV 1+2 IGG SER IA-IMP: NEGATIVE
HSV 1+2 IGG SER IA-IMP: POSITIVE
HSV1 IGG SER QL: 49.3 INDEX
HSV2 IGG SER QL: 0.25 INDEX
IMM GRANULOCYTES NFR BLD AUTO: 0.3 %
IRON SATN MFR SERPL: 33 %
IRON SERPL-MCNC: 143 UG/DL
KETONES URINE: NEGATIVE MG/DL
LDLC SERPL CALC-MCNC: 111 MG/DL
LEUKOCYTE ESTERASE URINE: NEGATIVE
LYMPHOCYTES # BLD AUTO: 2.49 K/UL
LYMPHOCYTES NFR BLD AUTO: 42.2 %
MAN DIFF?: NORMAL
MCHC RBC-ENTMCNC: 28.6 PG
MCHC RBC-ENTMCNC: 31.1 G/DL
MCV RBC AUTO: 91.8 FL
MICROSCOPIC-UA: NORMAL
MONOCYTES # BLD AUTO: 0.48 K/UL
MONOCYTES NFR BLD AUTO: 8.1 %
N GONORRHOEA RRNA SPEC QL NAA+PROBE: NOT DETECTED
NEUTROPHILS # BLD AUTO: 2.69 K/UL
NEUTROPHILS NFR BLD AUTO: 45.7 %
NITRITE URINE: NEGATIVE
NONHDLC SERPL-MCNC: 122 MG/DL
PH URINE: 5.5
PLATELET # BLD AUTO: 391 K/UL
POTASSIUM SERPL-SCNC: 4.7 MMOL/L
PROT SERPL-MCNC: 8.3 G/DL
PROTEIN URINE: NEGATIVE MG/DL
RBC # BLD: 4.76 M/UL
RBC # FLD: 14.4 %
RED BLOOD CELLS URINE: 0 /HPF
SODIUM SERPL-SCNC: 137 MMOL/L
SOURCE AMPLIFICATION: NORMAL
SPECIFIC GRAVITY URINE: 1.02
T PALLIDUM AB SER QL IA: NEGATIVE
T4 SERPL-MCNC: 9.4 UG/DL
THYROGLOB AB SERPL-ACNC: 133 IU/ML
THYROPEROXIDASE AB SERPL IA-ACNC: 149 IU/ML
TIBC SERPL-MCNC: 435 UG/DL
TRANSFERRIN SERPL-MCNC: 376 MG/DL
TRIGL SERPL-MCNC: 59 MG/DL
TSH SERPL-ACNC: 1.74 UIU/ML
UIBC SERPL-MCNC: 292 UG/DL
URATE SERPL-MCNC: 2.9 MG/DL
UROBILINOGEN URINE: 0.2 MG/DL
WBC # FLD AUTO: 5.9 K/UL
WHITE BLOOD CELLS URINE: 0 /HPF

## 2025-01-13 PROCEDURE — 99396 PREV VISIT EST AGE 40-64: CPT

## 2025-01-13 RX ORDER — AZITHROMYCIN 250 MG/1
250 TABLET, FILM COATED ORAL
Qty: 1 | Refills: 0 | Status: ACTIVE | COMMUNITY
Start: 2025-01-13 | End: 1900-01-01

## 2025-01-29 ENCOUNTER — APPOINTMENT (OUTPATIENT)
Age: 40
End: 2025-01-29

## 2025-01-29 LAB
RAPID RVP RESULT: DETECTED
SARS-COV-2 RNA RESP QL NAA+PROBE: DETECTED

## 2025-02-08 ENCOUNTER — RX RENEWAL (OUTPATIENT)
Age: 40
End: 2025-02-08

## 2025-03-20 DIAGNOSIS — L91.8 OTHER HYPERTROPHIC DISORDERS OF THE SKIN: ICD-10-CM

## 2025-04-12 ENCOUNTER — RESULT REVIEW (OUTPATIENT)
Age: 40
End: 2025-04-12

## 2025-04-12 ENCOUNTER — APPOINTMENT (OUTPATIENT)
Dept: MAMMOGRAPHY | Facility: CLINIC | Age: 40
End: 2025-04-12
Payer: COMMERCIAL

## 2025-04-12 ENCOUNTER — OUTPATIENT (OUTPATIENT)
Dept: OUTPATIENT SERVICES | Facility: HOSPITAL | Age: 40
LOS: 1 days | End: 2025-04-12
Payer: COMMERCIAL

## 2025-04-12 ENCOUNTER — APPOINTMENT (OUTPATIENT)
Dept: ULTRASOUND IMAGING | Facility: CLINIC | Age: 40
End: 2025-04-12
Payer: COMMERCIAL

## 2025-04-12 DIAGNOSIS — R92.30 DENSE BREASTS, UNSPECIFIED: ICD-10-CM

## 2025-04-12 DIAGNOSIS — Z00.8 ENCOUNTER FOR OTHER GENERAL EXAMINATION: ICD-10-CM

## 2025-04-12 DIAGNOSIS — Z12.39 ENCOUNTER FOR OTHER SCREENING FOR MALIGNANT NEOPLASM OF BREAST: ICD-10-CM

## 2025-04-12 PROCEDURE — 76641 ULTRASOUND BREAST COMPLETE: CPT | Mod: 26,50

## 2025-04-12 PROCEDURE — G0279: CPT

## 2025-04-12 PROCEDURE — G0279: CPT | Mod: 26

## 2025-04-12 PROCEDURE — 77066 DX MAMMO INCL CAD BI: CPT | Mod: 26

## 2025-04-12 PROCEDURE — 76641 ULTRASOUND BREAST COMPLETE: CPT

## 2025-04-12 PROCEDURE — 77066 DX MAMMO INCL CAD BI: CPT

## 2025-04-15 ENCOUNTER — APPOINTMENT (OUTPATIENT)
Age: 40
End: 2025-04-15

## 2025-04-23 ENCOUNTER — APPOINTMENT (OUTPATIENT)
Dept: OBGYN | Facility: CLINIC | Age: 40
End: 2025-04-23

## 2025-05-02 ENCOUNTER — APPOINTMENT (OUTPATIENT)
Dept: OBGYN | Facility: CLINIC | Age: 40
End: 2025-05-02